# Patient Record
Sex: FEMALE | Race: WHITE | Employment: OTHER | URBAN - METROPOLITAN AREA
[De-identification: names, ages, dates, MRNs, and addresses within clinical notes are randomized per-mention and may not be internally consistent; named-entity substitution may affect disease eponyms.]

---

## 2018-09-18 RX ORDER — FLUTICASONE PROPIONATE 50 MCG
2 SPRAY, SUSPENSION (ML) NASAL AS NEEDED
COMMUNITY
End: 2019-09-13

## 2018-09-18 RX ORDER — MAGNESIUM AMINO ACID CHELATE 100 MG
TABLET ORAL DAILY
COMMUNITY
End: 2021-04-26

## 2018-10-10 ENCOUNTER — HOSPITAL ENCOUNTER (OUTPATIENT)
Dept: PHYSICAL THERAPY | Facility: HOSPITAL | Age: 73
Discharge: HOME | End: 2018-10-10
Attending: ORTHOPAEDIC SURGERY
Payer: MEDICARE

## 2018-10-10 DIAGNOSIS — M48.061 SPINAL STENOSIS OF LUMBAR REGION WITHOUT NEUROGENIC CLAUDICATION: ICD-10-CM

## 2018-10-18 ENCOUNTER — HOSPITAL ENCOUNTER (INPATIENT)
Facility: HOSPITAL | Age: 73
LOS: 1 days | Discharge: HOME | DRG: 454 | End: 2018-10-19
Attending: ORTHOPAEDIC SURGERY | Admitting: ORTHOPAEDIC SURGERY
Payer: MEDICARE

## 2018-10-18 ENCOUNTER — ANESTHESIA (OUTPATIENT)
Dept: SURGERY | Facility: HOSPITAL | Age: 73
DRG: 454 | End: 2018-10-18
Payer: MEDICARE

## 2018-10-18 ENCOUNTER — ANESTHESIA EVENT (OUTPATIENT)
Dept: SURGERY | Facility: HOSPITAL | Age: 73
DRG: 454 | End: 2018-10-18
Payer: MEDICARE

## 2018-10-18 ENCOUNTER — APPOINTMENT (OUTPATIENT)
Dept: GENERAL RADIOLOGY | Facility: HOSPITAL | Age: 73
DRG: 454 | End: 2018-10-18
Attending: ORTHOPAEDIC SURGERY
Payer: MEDICARE

## 2018-10-18 DIAGNOSIS — M48.061 SPINAL STENOSIS OF LUMBAR REGION WITHOUT NEUROGENIC CLAUDICATION: Primary | ICD-10-CM

## 2018-10-18 PROCEDURE — 00BT0ZZ EXCISION OF SPINAL MENINGES, OPEN APPROACH: ICD-10-PCS | Performed by: ORTHOPAEDIC SURGERY

## 2018-10-18 PROCEDURE — A4216 STERILE WATER/SALINE, 10 ML: HCPCS

## 2018-10-18 PROCEDURE — 95940 IONM IN OPERATNG ROOM 15 MIN: CPT | Performed by: ORTHOPAEDIC SURGERY

## 2018-10-18 PROCEDURE — 0SG00AJ FUSION OF LUMBAR VERTEBRAL JOINT WITH INTERBODY FUSION DEVICE, POSTERIOR APPROACH, ANTERIOR COLUMN, OPEN APPROACH: ICD-10-PCS | Performed by: ORTHOPAEDIC SURGERY

## 2018-10-18 PROCEDURE — 85027 COMPLETE CBC AUTOMATED: CPT | Performed by: PHYSICIAN ASSISTANT

## 2018-10-18 PROCEDURE — 0SB20ZZ EXCISION OF LUMBAR VERTEBRAL DISC, OPEN APPROACH: ICD-10-PCS | Performed by: ORTHOPAEDIC SURGERY

## 2018-10-18 PROCEDURE — 0SG0071 FUSION OF LUMBAR VERTEBRAL JOINT WITH AUTOLOGOUS TISSUE SUBSTITUTE, POSTERIOR APPROACH, POSTERIOR COLUMN, OPEN APPROACH: ICD-10-PCS | Performed by: ORTHOPAEDIC SURGERY

## 2018-10-18 PROCEDURE — 88311 DECALCIFY TISSUE: CPT | Performed by: ORTHOPAEDIC SURGERY

## 2018-10-18 PROCEDURE — 88304 TISSUE EXAM BY PATHOLOGIST: CPT | Performed by: ORTHOPAEDIC SURGERY

## 2018-10-18 PROCEDURE — 4A11X4G MONITORING OF PERIPHERAL NERVOUS ELECTRICAL ACTIVITY, INTRAOPERATIVE, EXTERNAL APPROACH: ICD-10-PCS | Performed by: ORTHOPAEDIC SURGERY

## 2018-10-18 PROCEDURE — 95938 SOMATOSENSORY TESTING: CPT | Performed by: ORTHOPAEDIC SURGERY

## 2018-10-18 PROCEDURE — 95861 NEEDLE EMG 2 EXTREMITIES: CPT | Performed by: ORTHOPAEDIC SURGERY

## 2018-10-18 PROCEDURE — 76001 XR FLUOROSCOPE EXAM >1 HR EXTENSIVE (CPT=76001): CPT | Performed by: ORTHOPAEDIC SURGERY

## 2018-10-18 DEVICE — RELINE MAS RED SCREW 6.5X45 2C: Type: IMPLANTABLE DEVICE | Site: BACK | Status: FUNCTIONAL

## 2018-10-18 DEVICE — RELINE MAS MOD REDUCTION EXT: Type: IMPLANTABLE DEVICE | Site: BACK | Status: FUNCTIONAL

## 2018-10-18 DEVICE — RELINE MAS TI ROD 5.5X35 LRDTC: Type: IMPLANTABLE DEVICE | Site: BACK | Status: FUNCTIONAL

## 2018-10-18 DEVICE — RELINE MAS TI ROD 5.5X40 LRDTC: Type: IMPLANTABLE DEVICE | Site: BACK | Status: FUNCTIONAL

## 2018-10-18 DEVICE — RELINE MAS MOD SCREW 6.5X45 2C: Type: IMPLANTABLE DEVICE | Site: BACK | Status: FUNCTIONAL

## 2018-10-18 DEVICE — 8.12-MODULUS TLIF 8X10X30 12: Type: IMPLANTABLE DEVICE | Site: BACK | Status: FUNCTIONAL

## 2018-10-18 DEVICE — OSTEOCEL PRO BONE MATRIX MED: Type: IMPLANTABLE DEVICE | Site: BACK | Status: FUNCTIONAL

## 2018-10-18 DEVICE — RELINE LCK SCRW 5.5 OPEN TULIP: Type: IMPLANTABLE DEVICE | Site: BACK | Status: FUNCTIONAL

## 2018-10-18 RX ORDER — ONDANSETRON 2 MG/ML
4 INJECTION INTRAMUSCULAR; INTRAVENOUS EVERY 4 HOURS PRN
Status: DISCONTINUED | OUTPATIENT
Start: 2018-10-18 | End: 2018-10-19

## 2018-10-18 RX ORDER — BACITRACIN 50000 [USP'U]/1
INJECTION, POWDER, LYOPHILIZED, FOR SOLUTION INTRAMUSCULAR AS NEEDED
Status: DISCONTINUED | OUTPATIENT
Start: 2018-10-18 | End: 2018-10-18 | Stop reason: HOSPADM

## 2018-10-18 RX ORDER — ESCITALOPRAM OXALATE 20 MG/1
20 TABLET ORAL DAILY
Status: DISCONTINUED | OUTPATIENT
Start: 2018-10-19 | End: 2018-10-19

## 2018-10-18 RX ORDER — BUPROPION HYDROCHLORIDE 150 MG/1
150 TABLET, EXTENDED RELEASE ORAL
Status: DISCONTINUED | OUTPATIENT
Start: 2018-10-19 | End: 2018-10-19

## 2018-10-18 RX ORDER — ALPRAZOLAM 1 MG/1
3 TABLET ORAL EVERY MORNING
COMMUNITY

## 2018-10-18 RX ORDER — OXYCODONE HYDROCHLORIDE 10 MG/1
10 TABLET ORAL EVERY 4 HOURS PRN
Status: DISCONTINUED | OUTPATIENT
Start: 2018-10-18 | End: 2018-10-19

## 2018-10-18 RX ORDER — OXYCODONE HYDROCHLORIDE 5 MG/1
5 TABLET ORAL EVERY 4 HOURS PRN
Status: DISCONTINUED | OUTPATIENT
Start: 2018-10-18 | End: 2018-10-19

## 2018-10-18 RX ORDER — SODIUM CHLORIDE 9 MG/ML
INJECTION, SOLUTION INTRAVENOUS CONTINUOUS
Status: DISCONTINUED | OUTPATIENT
Start: 2018-10-18 | End: 2018-10-19

## 2018-10-18 RX ORDER — ALPRAZOLAM 1 MG/1
2 TABLET ORAL NIGHTLY
Status: DISCONTINUED | OUTPATIENT
Start: 2018-10-18 | End: 2018-10-19

## 2018-10-18 RX ORDER — ACETAMINOPHEN 500 MG
1000 TABLET ORAL ONCE
Status: DISCONTINUED | OUTPATIENT
Start: 2018-10-18 | End: 2018-10-18

## 2018-10-18 RX ORDER — DIPHENHYDRAMINE HYDROCHLORIDE 50 MG/ML
25 INJECTION INTRAMUSCULAR; INTRAVENOUS EVERY 4 HOURS PRN
Status: DISCONTINUED | OUTPATIENT
Start: 2018-10-18 | End: 2018-10-19

## 2018-10-18 RX ORDER — MORPHINE SULFATE 15 MG/1
15 TABLET ORAL EVERY 4 HOURS PRN
Status: DISCONTINUED | OUTPATIENT
Start: 2018-10-18 | End: 2018-10-19

## 2018-10-18 RX ORDER — POLYETHYLENE GLYCOL 3350 17 G/17G
17 POWDER, FOR SOLUTION ORAL DAILY PRN
Status: DISCONTINUED | OUTPATIENT
Start: 2018-10-18 | End: 2018-10-19

## 2018-10-18 RX ORDER — GABAPENTIN 600 MG/1
600 TABLET ORAL ONCE
Status: COMPLETED | OUTPATIENT
Start: 2018-10-18 | End: 2018-10-18

## 2018-10-18 RX ORDER — DOCUSATE SODIUM 100 MG/1
100 CAPSULE, LIQUID FILLED ORAL 2 TIMES DAILY
Status: DISCONTINUED | OUTPATIENT
Start: 2018-10-18 | End: 2018-10-19

## 2018-10-18 RX ORDER — ONDANSETRON 2 MG/ML
4 INJECTION INTRAMUSCULAR; INTRAVENOUS AS NEEDED
Status: DISCONTINUED | OUTPATIENT
Start: 2018-10-18 | End: 2018-10-18 | Stop reason: HOSPADM

## 2018-10-18 RX ORDER — CELECOXIB 200 MG/1
200 CAPSULE ORAL ONCE
Status: COMPLETED | OUTPATIENT
Start: 2018-10-18 | End: 2018-10-18

## 2018-10-18 RX ORDER — SODIUM PHOSPHATE, DIBASIC AND SODIUM PHOSPHATE, MONOBASIC 7; 19 G/133ML; G/133ML
1 ENEMA RECTAL ONCE AS NEEDED
Status: DISCONTINUED | OUTPATIENT
Start: 2018-10-18 | End: 2018-10-19

## 2018-10-18 RX ORDER — CEFAZOLIN SODIUM/WATER 2 G/20 ML
2 SYRINGE (ML) INTRAVENOUS ONCE
Status: COMPLETED | OUTPATIENT
Start: 2018-10-18 | End: 2018-10-18

## 2018-10-18 RX ORDER — DIAZEPAM 5 MG/1
5 TABLET ORAL EVERY 6 HOURS PRN
Status: DISCONTINUED | OUTPATIENT
Start: 2018-10-18 | End: 2018-10-19

## 2018-10-18 RX ORDER — MORPHINE SULFATE 4 MG/ML
2 INJECTION, SOLUTION INTRAMUSCULAR; INTRAVENOUS EVERY 2 HOUR PRN
Status: DISCONTINUED | OUTPATIENT
Start: 2018-10-18 | End: 2018-10-19

## 2018-10-18 RX ORDER — MORPHINE SULFATE 4 MG/ML
1 INJECTION, SOLUTION INTRAMUSCULAR; INTRAVENOUS EVERY 2 HOUR PRN
Status: DISCONTINUED | OUTPATIENT
Start: 2018-10-18 | End: 2018-10-19

## 2018-10-18 RX ORDER — HYDROCODONE BITARTRATE AND ACETAMINOPHEN 5; 325 MG/1; MG/1
2 TABLET ORAL AS NEEDED
Status: DISCONTINUED | OUTPATIENT
Start: 2018-10-18 | End: 2018-10-18 | Stop reason: HOSPADM

## 2018-10-18 RX ORDER — TIZANIDINE 2 MG/1
2 TABLET ORAL 3 TIMES DAILY PRN
Status: DISCONTINUED | OUTPATIENT
Start: 2018-10-18 | End: 2018-10-19

## 2018-10-18 RX ORDER — ESTRADIOL 0.05 MG/D
1 PATCH TRANSDERMAL
COMMUNITY
End: 2019-09-13

## 2018-10-18 RX ORDER — ONDANSETRON 2 MG/ML
4 INJECTION INTRAMUSCULAR; INTRAVENOUS ONCE
Status: COMPLETED | OUTPATIENT
Start: 2018-10-18 | End: 2018-10-18

## 2018-10-18 RX ORDER — SODIUM CHLORIDE, SODIUM LACTATE, POTASSIUM CHLORIDE, CALCIUM CHLORIDE 600; 310; 30; 20 MG/100ML; MG/100ML; MG/100ML; MG/100ML
INJECTION, SOLUTION INTRAVENOUS CONTINUOUS
Status: DISCONTINUED | OUTPATIENT
Start: 2018-10-18 | End: 2018-10-19

## 2018-10-18 RX ORDER — AMLODIPINE BESYLATE 5 MG/1
5 TABLET ORAL DAILY
Status: DISCONTINUED | OUTPATIENT
Start: 2018-10-19 | End: 2018-10-19

## 2018-10-18 RX ORDER — CEFAZOLIN SODIUM/WATER 2 G/20 ML
2 SYRINGE (ML) INTRAVENOUS EVERY 8 HOURS
Status: DISCONTINUED | OUTPATIENT
Start: 2018-10-18 | End: 2018-10-18

## 2018-10-18 RX ORDER — MORPHINE SULFATE 4 MG/ML
2 INJECTION, SOLUTION INTRAMUSCULAR; INTRAVENOUS EVERY 5 MIN PRN
Status: DISCONTINUED | OUTPATIENT
Start: 2018-10-18 | End: 2018-10-18 | Stop reason: HOSPADM

## 2018-10-18 RX ORDER — NALOXONE HYDROCHLORIDE 0.4 MG/ML
80 INJECTION, SOLUTION INTRAMUSCULAR; INTRAVENOUS; SUBCUTANEOUS AS NEEDED
Status: DISCONTINUED | OUTPATIENT
Start: 2018-10-18 | End: 2018-10-18 | Stop reason: HOSPADM

## 2018-10-18 RX ORDER — ALPRAZOLAM 1 MG/1
1 TABLET ORAL EVERY MORNING
Status: DISCONTINUED | OUTPATIENT
Start: 2018-10-19 | End: 2018-10-19

## 2018-10-18 RX ORDER — BUPIVACAINE HYDROCHLORIDE AND EPINEPHRINE 5; 5 MG/ML; UG/ML
INJECTION, SOLUTION EPIDURAL; INTRACAUDAL; PERINEURAL AS NEEDED
Status: DISCONTINUED | OUTPATIENT
Start: 2018-10-18 | End: 2018-10-18 | Stop reason: HOSPADM

## 2018-10-18 RX ORDER — SODIUM CHLORIDE, SODIUM LACTATE, POTASSIUM CHLORIDE, CALCIUM CHLORIDE 600; 310; 30; 20 MG/100ML; MG/100ML; MG/100ML; MG/100ML
INJECTION, SOLUTION INTRAVENOUS CONTINUOUS
Status: DISCONTINUED | OUTPATIENT
Start: 2018-10-18 | End: 2018-10-18 | Stop reason: HOSPADM

## 2018-10-18 RX ORDER — CEFAZOLIN SODIUM/WATER 2 G/20 ML
2 SYRINGE (ML) INTRAVENOUS EVERY 8 HOURS
Status: COMPLETED | OUTPATIENT
Start: 2018-10-18 | End: 2018-10-19

## 2018-10-18 RX ORDER — DIPHENHYDRAMINE HCL 25 MG
25 CAPSULE ORAL EVERY 4 HOURS PRN
Status: DISCONTINUED | OUTPATIENT
Start: 2018-10-18 | End: 2018-10-19

## 2018-10-18 RX ORDER — DEXAMETHASONE SODIUM PHOSPHATE 4 MG/ML
4 VIAL (ML) INJECTION AS NEEDED
Status: DISCONTINUED | OUTPATIENT
Start: 2018-10-18 | End: 2018-10-18 | Stop reason: HOSPADM

## 2018-10-18 RX ORDER — BISACODYL 10 MG
10 SUPPOSITORY, RECTAL RECTAL
Status: DISCONTINUED | OUTPATIENT
Start: 2018-10-18 | End: 2018-10-19

## 2018-10-18 RX ORDER — METOCLOPRAMIDE HYDROCHLORIDE 5 MG/ML
10 INJECTION INTRAMUSCULAR; INTRAVENOUS EVERY 6 HOURS PRN
Status: DISCONTINUED | OUTPATIENT
Start: 2018-10-18 | End: 2018-10-19

## 2018-10-18 RX ORDER — HYDROCODONE BITARTRATE AND ACETAMINOPHEN 5; 325 MG/1; MG/1
1 TABLET ORAL AS NEEDED
Status: DISCONTINUED | OUTPATIENT
Start: 2018-10-18 | End: 2018-10-18 | Stop reason: HOSPADM

## 2018-10-18 RX ORDER — METOCLOPRAMIDE HYDROCHLORIDE 5 MG/ML
10 INJECTION INTRAMUSCULAR; INTRAVENOUS AS NEEDED
Status: DISCONTINUED | OUTPATIENT
Start: 2018-10-18 | End: 2018-10-18 | Stop reason: HOSPADM

## 2018-10-18 RX ORDER — MORPHINE SULFATE 4 MG/ML
4 INJECTION, SOLUTION INTRAMUSCULAR; INTRAVENOUS EVERY 2 HOUR PRN
Status: DISCONTINUED | OUTPATIENT
Start: 2018-10-18 | End: 2018-10-19

## 2018-10-18 RX ORDER — ACETAMINOPHEN 160 MG
2000 TABLET,DISINTEGRATING ORAL DAILY
Status: DISCONTINUED | OUTPATIENT
Start: 2018-10-19 | End: 2018-10-19

## 2018-10-18 NOTE — H&P
Thank you very much for requesting this consultation. I had the opportunity to evaluate and initiate care for your patient today, as per your request.  I have carefully reviewed the patient's intake questionnaire before our encounter today.   HISTORY OF [facility] Years of education:                 Number of children:                Social History Main Topics    Smoking status: Former Smoker                                                                Packs/day: 0.00      Years: 30.00          Quit date: 1/1/200 no in-ablitity to completely empty bladder  NEURO:no balance problems, no seizure problems, no increased clumsiness, dropping things, no tremor,  no headaches/ migraines  PSYCHE:no depression, no anxiety.   HEMATOLOGY: denies hx anemia; denies bruising or e Neg   Trochanteric Bursa Neg Neg      Strength:                                                                    DTR's:                                            Left        Right                                               Left        Right performed. Risks and benefits discussed in detail. Risks including but not limited to bleeding infection, spinal leaks, neurologic injury, paralysis and worsening of symptoms. No guarantees made.  If fusion recommended risks of pseaudarthosis, austyn

## 2018-10-18 NOTE — PROGRESS NOTES
Family forgot pt's brace. [id number] Lou Lindsey for pt to get up tonight without brace, just no PT until brace is here. Family aware to bring brace, per Silvia Austin.

## 2018-10-18 NOTE — CONSULTS
[facility] Hospitalist H&P       CC: medicine consult for comanagement of medical conditions     PCP: Viet VITAL    History of Present Illness: Patient is a 68year old female with PMH sig for HTN, HL, Sjogren's syndrome, asthma and anxiety is now POD Medications Marked as Taking for the 10/18/18 encounter [facility] Encounter): ALPRAZolam 1 MG Oral Tab Take 2 mg by mouth nightly. Disp:  Rfl:    Acetaminophen (ACETAMINOPHEN EXTRA STRENGTH) 500 MG Oral Cap Take 1,000 mg by mouth one time.    Disp:  Rfl: tongue normal. Teeth and gums normal.   Neck: Supple, symmetrical, trachea midline, no cervical or supraclavicular lymph adenopathy, thyroid: no enlargment/tenderness/nodules appreciated   Lungs:   Clear to auscultation bilaterally.  Normal effort   Chest w MD Kevyn               ASSESSMENT / PLAN:     Patient is a 68year old female with PMH sig for HTN, HL, Sjogren's syndrome, asthma and anxiety is now POD #0 s/p L4/5 decompression with fusion due to lumbar spinal stenosis.      Lumbar spinal stenosis s/p L4

## 2018-10-18 NOTE — BRIEF OP NOTE
Pre-Operative Diagnosis: Spinal stenosis of lumbar region without neurogenic claudication [M48.061]     Post-Operative Diagnosis: same      Procedure Performed:   Procedure(s):  Lumbar 4-Lumbar 5 decompression with fusion        Surgeon(s) and Role:     *

## 2018-10-18 NOTE — ANESTHESIA POSTPROCEDURE EVALUATION
[id number] Patient Status:  Surgery Admit   Age/Gender 68year old female MRN [id number]   [facility] SURGERY Attending Kristin Meyer MD   Hosp Day # 0 [facility] [address] [facility]       Anesthesia Post-op Note    Proced

## 2018-10-18 NOTE — PROGRESS NOTES
S: Moderate back pain with no leg pain. No numbness. No weakness. No headaches. Patient has no other concerns. Inspection:  Awake alert No acute distress.  No difficulty breathing     Blood pressure 135/70, pulse 53, temperature 97.8 °F (36.6 °C), te

## 2018-10-18 NOTE — ANESTHESIA PREPROCEDURE EVALUATION
PRE-OP EVALUATION    Patient Name: Amanda Carter    Pre-op Diagnosis: Spinal stenosis of lumbar region without neurogenic claudication [M48.061]    Procedure(s):  Lumbar 4-Lumbar 5 decompression with fusion        Surgeon(s) and Role:     * Magaly Nicholas Cardiovascular    Negative cardiovascular ROS.              (+) hypertension   (+) hyperlipidemia                                  Endo/Other    Negative endo/other ROS.                               Pulmonary      (+) asthma                     Neuro/Psy

## 2018-10-19 VITALS
HEART RATE: 67 BPM | OXYGEN SATURATION: 93 % | DIASTOLIC BLOOD PRESSURE: 47 MMHG | RESPIRATION RATE: 18 BRPM | HEIGHT: 62 IN | SYSTOLIC BLOOD PRESSURE: 100 MMHG | WEIGHT: 156.94 LBS | BODY MASS INDEX: 28.88 KG/M2 | TEMPERATURE: 98 F

## 2018-10-19 PROCEDURE — 97166 OT EVAL MOD COMPLEX 45 MIN: CPT

## 2018-10-19 PROCEDURE — 97161 PT EVAL LOW COMPLEX 20 MIN: CPT

## 2018-10-19 PROCEDURE — 97535 SELF CARE MNGMENT TRAINING: CPT

## 2018-10-19 PROCEDURE — 97530 THERAPEUTIC ACTIVITIES: CPT

## 2018-10-19 RX ORDER — HYDROCODONE BITARTRATE AND ACETAMINOPHEN 10; 325 MG/1; MG/1
2 TABLET ORAL EVERY 6 HOURS PRN
Status: DISCONTINUED | OUTPATIENT
Start: 2018-10-19 | End: 2018-10-19

## 2018-10-19 RX ORDER — HYDROCODONE BITARTRATE AND ACETAMINOPHEN 10; 325 MG/1; MG/1
1 TABLET ORAL EVERY 6 HOURS PRN
Status: DISCONTINUED | OUTPATIENT
Start: 2018-10-19 | End: 2018-10-19

## 2018-10-19 NOTE — OCCUPATIONAL THERAPY NOTE
OCCUPATIONAL THERAPY QUICK EVALUATION - INPATIENT    Room Number: 373/373-A  Evaluation Date: 10/19/2018     Type of Evaluation: Quick Eval  Presenting Problem: (L4-5 TLIF)    Physician Order: IP Consult to Occupational Therapy  Reason for Therapy:  ADL/IA Standard height toilet; Toilet riser with arms  Shower/Tub and Equipment: Tub-shower combo; Shower chair;Hand-held showerhead  Other Equipment: Reacher; Other (Comment)(RW)    Occupation/Status: (retired)  Hand Dominance: Right  Drives: Yes  Patient Regularly Supervision  Sit to Stand: Supervision    Skilled Therapy Provided: Patient was educated on spine precautions, energy conservation, sequencing, and safety for ADLs. OT educated patient on adaptive techniques and equipment for lower body dressing.  Patient jimbo Short Form was completed and  this patient  is demonstrating a 33% degree impairment in activities of daily living. Research supports that patients with this level of impairment often benefit from home.    Recommend home with family assistance during recove

## 2018-10-19 NOTE — DISCHARGE SUMMARY
General Medicine Discharge Summary     Patient ID:  Clarita Kirk  68year old  2/10/1945    Admit date: 10/18/2018    Discharge date and time: 10/19/18    Attending Physician: Latoya Jordan MD     P submitted for interpretation postoperatively. Image(s) demonstrate posterior fusion L4-L5. Yeimi Cook Correlation with real time fluoroscopy examination is recommended. See operative report for further details.       CONCLUSION: Intraoperative images for operative c

## 2018-10-19 NOTE — PROGRESS NOTES
S: Moderate back pain with no leg pain. Patient has no numbness or weakness. She walked the floor already. She is happy with her progress. She has no other concerns. Inspection:  Awake alert No acute distress.  No difficulty breathing     Blood pres

## 2018-10-19 NOTE — PHYSICAL THERAPY NOTE
PHYSICAL THERAPY QUICK EVALUATION - INPATIENT    Room Number: 373/373-A  Evaluation Date: 10/19/2018  Presenting Problem: s/p L4/5 TLIF 10/18/18  Physician Order: PT Eval and Treat    Problem List  Active Problems:    * No active hospital problems.  * lifts you up\"  Discouraged pt from using all the time as she has adequate strength in bilat LE to stand.   Pt also reports she plans on sleeping in this chair, and reports it is a firm chair    OBJECTIVE  Precautions: Lumbar brace;Spine  Fall Risk: Standar supine, dtr present, educated in role of PT, goals for session, spine precautions, log roll technique, use/management of LSO.   Pt demonstrating all fxal mobility including bed mobility, transfers, ambulation on levels and on stairs, simulated car transfers the following goals . ..     Patient was able to transfer Safely and independently   Patient able to ambulate on level surfaces Safely and independently

## 2018-10-19 NOTE — PROGRESS NOTES
PIV removed. Dressing changed to coverlet on back, instructed pt's daughter on how to do dressing change. Pt watched spine video. Went over DC instructions, pt verbalizes understanding. Pt taken via w/c to lobby by pt transporter. Pt has all belongings.  Sc

## 2018-10-21 ENCOUNTER — TELEPHONE (OUTPATIENT)
Dept: MEDSURG UNIT | Facility: HOSPITAL | Age: 73
End: 2018-10-21

## 2018-10-21 NOTE — TELEPHONE ENCOUNTER
Patient's daughter called to report fever of 101.4 two days post op. She denies sob, chest pain, painful urination, increased back or leg pain, calf tenderness upon squeezing. I advised frequent walks and use of incentive spirometer throughout the day.

## 2018-10-28 NOTE — OPERATIVE REPORT
[facility]    PATIENT'S NAME: Neeraj Jacobo   ATTENDING PHYSICIAN: Charles Almazan M.D. OPERATING PHYSICIAN: Charles Almazan M.D.    PATIENT ACCOUNT#:   [de-identified]    LOCATION:  [address] [place] [address]  MEDICAL RECORD #:   [id number]       DATE OF BIRTH: back was sterilely prepped and draped. AP fluoroscopy was wheeled in. We marked pedicles at all levels outlined above bilaterally.   Two separate incisions 1-1/2 fingerbreadths lateral to this approximately an inch and a half long were made with a larger facet.    Pre-operative measurements documenting a mismatch between pelvic incidence and lumbar lordosis was made, thus documenting clinically significant kyphosis. Our attention first turned to the L4-L5 level. The facet was osteotomized and resected. Kerrisons, and a pituitary. It was sent to Pathology for analysis. Our attention then turned to utilization of the separate contralateral incision. With a retractor in place we localized the far lateral region of the level.   We examined areas more far X-rays confirmed appropriate localization of our instrumentation. Valsalva maneuver confirmed that there was no spinal leak. The wound was thoroughly irrigated. Hemostasis was maintained. The fascia was reapproximated bilaterally using 1-0 Vicryl.   A

## 2019-02-07 PROBLEM — G89.29 CHRONIC LEFT-SIDED LOW BACK PAIN WITHOUT SCIATICA: Status: ACTIVE | Noted: 2019-02-07

## 2019-02-07 PROBLEM — M54.50 CHRONIC LEFT-SIDED LOW BACK PAIN WITHOUT SCIATICA: Status: ACTIVE | Noted: 2019-02-07

## 2019-02-07 PROBLEM — Z98.1 S/P LUMBAR FUSION: Status: ACTIVE | Noted: 2019-02-07

## 2019-06-27 PROCEDURE — 86376 MICROSOMAL ANTIBODY EACH: CPT | Performed by: OTHER

## 2019-06-27 PROCEDURE — 86800 THYROGLOBULIN ANTIBODY: CPT | Performed by: OTHER

## 2019-06-27 PROCEDURE — 83519 RIA NONANTIBODY: CPT | Performed by: OTHER

## 2022-01-05 ENCOUNTER — WALK IN (OUTPATIENT)
Dept: URGENT CARE | Age: 77
End: 2022-01-05
Attending: EMERGENCY MEDICINE

## 2022-01-05 VITALS
OXYGEN SATURATION: 95 % | SYSTOLIC BLOOD PRESSURE: 144 MMHG | DIASTOLIC BLOOD PRESSURE: 79 MMHG | HEART RATE: 69 BPM | RESPIRATION RATE: 18 BRPM | TEMPERATURE: 97.3 F

## 2022-01-05 DIAGNOSIS — H53.8 BLURRED VISION, BILATERAL: ICD-10-CM

## 2022-01-05 DIAGNOSIS — R20.2 PARESTHESIAS: ICD-10-CM

## 2022-01-05 DIAGNOSIS — R51.9 NONINTRACTABLE HEADACHE, UNSPECIFIED CHRONICITY PATTERN, UNSPECIFIED HEADACHE TYPE: Primary | ICD-10-CM

## 2022-01-05 PROCEDURE — 99202 OFFICE O/P NEW SF 15 MIN: CPT

## 2022-01-05 RX ORDER — ALPRAZOLAM 0.5 MG/1
5 TABLET ORAL
COMMUNITY

## 2022-01-05 RX ORDER — BUPROPION HYDROCHLORIDE 150 MG/1
TABLET ORAL
COMMUNITY
Start: 2021-11-24

## 2022-01-05 RX ORDER — FLUTICASONE PROPIONATE 50 MCG
SPRAY, SUSPENSION (ML) NASAL
COMMUNITY
Start: 2021-01-12

## 2022-01-05 RX ORDER — ESCITALOPRAM OXALATE 20 MG/1
20 TABLET ORAL DAILY
COMMUNITY
Start: 2021-03-07

## 2022-01-05 RX ORDER — LORATADINE 10 MG/1
TABLET ORAL
COMMUNITY

## 2022-01-05 RX ORDER — AMLODIPINE BESYLATE 5 MG/1
TABLET ORAL
COMMUNITY

## 2022-01-05 ASSESSMENT — PAIN SCALES - GENERAL
PAINLEVEL_OUTOF10: 0
PAINLEVEL: 0

## 2022-01-05 ASSESSMENT — ENCOUNTER SYMPTOMS
GASTROINTESTINAL NEGATIVE: 1
RESPIRATORY NEGATIVE: 1
NUMBNESS: 1
HEADACHES: 1
CONSTITUTIONAL NEGATIVE: 1

## 2022-01-18 PROBLEM — M54.81 OCCIPITAL NEURALGIA OF RIGHT SIDE: Status: ACTIVE | Noted: 2022-01-18

## 2022-01-18 PROBLEM — M47.812 CERVICAL SPONDYLOSIS: Status: ACTIVE | Noted: 2022-01-18

## 2022-01-18 PROBLEM — M48.02 CERVICAL STENOSIS OF SPINAL CANAL: Status: ACTIVE | Noted: 2022-01-18

## 2025-06-03 ENCOUNTER — APPOINTMENT (OUTPATIENT)
Dept: URGENT CARE | Age: 80
End: 2025-06-03
Attending: EMERGENCY MEDICINE

## 2025-08-26 ENCOUNTER — OFFICE VISIT (OUTPATIENT)
Dept: RHEUMATOLOGY | Facility: CLINIC | Age: 80
End: 2025-08-26

## 2025-08-26 VITALS
RESPIRATION RATE: 16 BRPM | HEART RATE: 108 BPM | WEIGHT: 154 LBS | HEIGHT: 61 IN | BODY MASS INDEX: 29.07 KG/M2 | SYSTOLIC BLOOD PRESSURE: 106 MMHG | OXYGEN SATURATION: 96 % | DIASTOLIC BLOOD PRESSURE: 72 MMHG | TEMPERATURE: 98 F

## 2025-08-26 DIAGNOSIS — R49.0 HOARSENESS OF VOICE: ICD-10-CM

## 2025-08-26 DIAGNOSIS — H04.129 DRY EYE: ICD-10-CM

## 2025-08-26 DIAGNOSIS — E11.40 TYPE 2 DIABETES MELLITUS WITH DIABETIC NEUROPATHY, UNSPECIFIED WHETHER LONG TERM INSULIN USE (HCC): ICD-10-CM

## 2025-08-26 DIAGNOSIS — G62.9 NEUROPATHY: ICD-10-CM

## 2025-08-26 DIAGNOSIS — Z98.1 S/P LUMBAR FUSION: ICD-10-CM

## 2025-08-26 DIAGNOSIS — M35.00 SJOGREN'S SYNDROME, WITH UNSPECIFIED ORGAN INVOLVEMENT (HCC): Primary | ICD-10-CM

## 2025-08-26 DIAGNOSIS — M25.542 ARTHRALGIA OF BOTH HANDS: ICD-10-CM

## 2025-08-26 DIAGNOSIS — K14.6 BURNING MOUTH SYNDROME: ICD-10-CM

## 2025-08-26 DIAGNOSIS — R68.2 DRY MOUTH: ICD-10-CM

## 2025-08-26 DIAGNOSIS — R42 LIGHTHEADEDNESS: ICD-10-CM

## 2025-08-26 DIAGNOSIS — M25.541 ARTHRALGIA OF BOTH HANDS: ICD-10-CM

## 2025-08-26 PROCEDURE — 1159F MED LIST DOCD IN RCRD: CPT | Performed by: INTERNAL MEDICINE

## 2025-08-26 PROCEDURE — G2211 COMPLEX E/M VISIT ADD ON: HCPCS | Performed by: INTERNAL MEDICINE

## 2025-08-26 PROCEDURE — 3074F SYST BP LT 130 MM HG: CPT | Performed by: INTERNAL MEDICINE

## 2025-08-26 PROCEDURE — 3078F DIAST BP <80 MM HG: CPT | Performed by: INTERNAL MEDICINE

## 2025-08-26 PROCEDURE — 1160F RVW MEDS BY RX/DR IN RCRD: CPT | Performed by: INTERNAL MEDICINE

## 2025-08-26 PROCEDURE — 99205 OFFICE O/P NEW HI 60 MIN: CPT | Performed by: INTERNAL MEDICINE

## 2025-08-26 PROCEDURE — 3008F BODY MASS INDEX DOCD: CPT | Performed by: INTERNAL MEDICINE

## 2025-08-26 RX ORDER — LEVOCETIRIZINE DIHYDROCHLORIDE 5 MG/1
TABLET, FILM COATED ORAL
COMMUNITY

## 2025-08-26 RX ORDER — CLOTRIMAZOLE 10 MG/1
10 LOZENGE ORAL
Qty: 70 EACH | Refills: 1 | Status: SHIPPED | OUTPATIENT
Start: 2025-08-26

## 2025-08-26 RX ORDER — DAPAGLIFLOZIN 10 MG/1
10 TABLET, FILM COATED ORAL DAILY
COMMUNITY

## 2025-08-26 RX ORDER — TIRZEPATIDE 5 MG/.5ML
5 INJECTION, SOLUTION SUBCUTANEOUS WEEKLY
COMMUNITY

## 2025-08-26 RX ORDER — GABAPENTIN 100 MG/1
CAPSULE ORAL
Qty: 228 CAPSULE | Refills: 1 | Status: SHIPPED | OUTPATIENT
Start: 2025-08-26 | End: 2025-11-24

## (undated) DEVICE — LIGHT HANDLE

## (undated) DEVICE — DRILL SRG OIL CRTDG MAESTRO

## (undated) DEVICE — SHEET,DRAPE,70X100,STERILE: Brand: MEDLINE

## (undated) DEVICE — RELINE POWER

## (undated) DEVICE — WRAP COOLING BACK W/NO PILLOW

## (undated) DEVICE — MAXCESS MAS TLIF 2 KIT ACCESS

## (undated) DEVICE — Device

## (undated) DEVICE — NVM5 NEEDLE MODULE S

## (undated) DEVICE — WIRE FX PRCPT KRSH BVL BLNT

## (undated) DEVICE — LAMINECTOMY CDS: Brand: MEDLINE INDUSTRIES, INC.

## (undated) DEVICE — NVM5 NEEDLE MODULE M/E

## (undated) DEVICE — COVER,TABLE,HVY DUTY,EXT,77"X96",STRL: Brand: MEDLINE

## (undated) DEVICE — 3.0MM PRECISION NEURO (MATCH HEAD)

## (undated) DEVICE — STERILE POLYISOPRENE POWDER-FREE SURGICAL GLOVES: Brand: PROTEXIS

## (undated) DEVICE — SUTURE VICRYL 2-0 FSL

## (undated) DEVICE — DRAPE TABLE COVER 44X90 TC-10

## (undated) DEVICE — NUVAMAP O.R. TECHNOLOGY

## (undated) DEVICE — SOL  .9 1000ML BTL

## (undated) DEVICE — NV I-PAS III DIAMOND TIP

## (undated) DEVICE — TRANSPOSAL ULTRAFLEX DUO/QUAD ULTRA CART MANIFOLD

## (undated) DEVICE — 3M™ MICROFOAM™ TAPE 1528-4: Brand: 3M™ MICROFOAM™

## (undated) DEVICE — KENDALL SCD EXPRESS SLEEVES, THIGH LENGTH, MEDIUM: Brand: KENDALL SCD

## (undated) DEVICE — UNDYED BRAIDED (POLYGLACTIN 910), SYNTHETIC ABSORBABLE SUTURE: Brand: COATED VICRYL

## (undated) DEVICE — BLDE PRCPT FSCL SPLTR DISP

## (undated) DEVICE — GRAFT DELIVERY SYS MODULE

## (undated) DEVICE — NV CLIP DSC&IN-LINE ACTIVATOR

## (undated) DEVICE — FLOSEAL HEMOSTATIC MATRIX, 5ML: Brand: FLOSEAL HEMOSTATIC MATRIX

## (undated) DEVICE — DRAPE C-ARM UNIVERSAL

## (undated) DEVICE — C-ARMOR C-ARM EQUIPMENT COVERS CLEAR STERILE UNIVERSAL FIT 12 PER CASE: Brand: C-ARMOR

## (undated) DEVICE — SUTURE VICRYL 1 OS-6